# Patient Record
Sex: MALE | Race: BLACK OR AFRICAN AMERICAN | NOT HISPANIC OR LATINO | Employment: FULL TIME | URBAN - METROPOLITAN AREA
[De-identification: names, ages, dates, MRNs, and addresses within clinical notes are randomized per-mention and may not be internally consistent; named-entity substitution may affect disease eponyms.]

---

## 2023-08-31 PROBLEM — E78.1 HYPERTRIGLYCERIDEMIA: Status: ACTIVE | Noted: 2023-08-31

## 2023-08-31 PROBLEM — E10.65 POORLY CONTROLLED TYPE 1 DIABETES MELLITUS (MULTI): Status: ACTIVE | Noted: 2023-08-31

## 2023-08-31 PROBLEM — R82.4 KETONURIA: Status: ACTIVE | Noted: 2023-08-31

## 2023-08-31 PROBLEM — D80.2 IGA DEFICIENCY (MULTI): Status: ACTIVE | Noted: 2023-08-31

## 2023-08-31 RX ORDER — INSULIN LISPRO 100 [IU]/ML
INJECTION, SOLUTION INTRAVENOUS; SUBCUTANEOUS
COMMUNITY
Start: 2015-08-05 | End: 2023-10-09 | Stop reason: SDUPTHER

## 2023-08-31 RX ORDER — PEN NEEDLE, DIABETIC 30 GX3/16"
NEEDLE, DISPOSABLE MISCELLANEOUS
COMMUNITY

## 2023-08-31 RX ORDER — ISOPROPYL ALCOHOL 70 ML/100ML
SWAB TOPICAL
COMMUNITY

## 2023-08-31 RX ORDER — BLOOD SUGAR DIAGNOSTIC
STRIP MISCELLANEOUS
COMMUNITY
Start: 2016-02-22

## 2023-08-31 RX ORDER — URINE ACETONE TEST STRIPS
STRIP MISCELLANEOUS
COMMUNITY
Start: 2015-08-05

## 2023-08-31 RX ORDER — INSULIN DEGLUDEC 100 U/ML
34 INJECTION, SOLUTION SUBCUTANEOUS NIGHTLY
COMMUNITY
Start: 2018-02-01 | End: 2023-10-09 | Stop reason: SDUPTHER

## 2023-08-31 RX ORDER — IBUPROFEN 200 MG
16 TABLET ORAL AS NEEDED
COMMUNITY
Start: 2015-08-27

## 2023-08-31 RX ORDER — LANCETS 33 GAUGE
EACH MISCELLANEOUS
COMMUNITY
Start: 2016-02-22

## 2023-10-09 ENCOUNTER — OFFICE VISIT (OUTPATIENT)
Dept: ENDOCRINOLOGY | Facility: CLINIC | Age: 23
End: 2023-10-09
Payer: COMMERCIAL

## 2023-10-09 VITALS — WEIGHT: 158 LBS | BODY MASS INDEX: 24.02 KG/M2

## 2023-10-09 DIAGNOSIS — D80.2 IGA DEFICIENCY (MULTI): ICD-10-CM

## 2023-10-09 DIAGNOSIS — E78.1 HYPERTRIGLYCERIDEMIA: ICD-10-CM

## 2023-10-09 DIAGNOSIS — E10.65 POORLY CONTROLLED TYPE 1 DIABETES MELLITUS (MULTI): Primary | ICD-10-CM

## 2023-10-09 LAB — POC HEMOGLOBIN A1C: 8.5 % (ref 4.2–6.5)

## 2023-10-09 PROCEDURE — 83036 HEMOGLOBIN GLYCOSYLATED A1C: CPT | Performed by: INTERNAL MEDICINE

## 2023-10-09 PROCEDURE — 3052F HG A1C>EQUAL 8.0%<EQUAL 9.0%: CPT | Performed by: INTERNAL MEDICINE

## 2023-10-09 PROCEDURE — 99214 OFFICE O/P EST MOD 30 MIN: CPT | Performed by: INTERNAL MEDICINE

## 2023-10-09 RX ORDER — BLOOD-GLUCOSE SENSOR
1 EACH MISCELLANEOUS
Qty: 2 EACH | Refills: 11 | Status: SHIPPED | OUTPATIENT
Start: 2023-10-09 | End: 2024-02-06 | Stop reason: SDUPTHER

## 2023-10-09 RX ORDER — INSULIN LISPRO 100 [IU]/ML
15 INJECTION, SOLUTION INTRAVENOUS; SUBCUTANEOUS
Qty: 5 EACH | Refills: 11 | Status: SHIPPED | OUTPATIENT
Start: 2023-10-09

## 2023-10-09 NOTE — PROGRESS NOTES
Patient ID: Celso Warren is a 22 y.o. male who presents for No chief complaint on file..  HPI  The patient comes in for follow up.    He has type 1 diabetes uncontrolled hypertriglyceridemia IgA deficiency.    He continues on Tresiba 34 units Humalog by pen 1 unit for 9 g of carb with a correction of 1 unit lower 30 points.    He has been doing fingersticks of late.    At 1 point he was doing the freestyle mahnaz 14-day.    He has had no severe low blood sugars.    Physically he has no complaints.    ROS  Comprehensive review of systems is negative.    Objective   Physical Exam  Weight 158 up 3 pounds    ENT normal. No adenopathy  Fundi normal  Thyroid palpable and normal. No nodules  Chest clear to auscultation  Heart sounds are normal  Abdomen nontender. Bowel sounds normal. No organomegaly  Feet are okay  Normal vibration and monofilament sensation normal pulses, no lesions    Assessment/Plan     1.  Type 1 diabetes uncontrolled with no complications  2.  Hypertriglyceridemia resolved  3.  IgA deficiency    He will try out the freestyle mahnaz 3.    We discussed hypoglycemia and treatment.    We will check hemoglobin A1c by fingerstick today.    He will follow-up with me in 3 months fasting sooner as needed.

## 2024-01-08 ENCOUNTER — APPOINTMENT (OUTPATIENT)
Dept: ENDOCRINOLOGY | Facility: CLINIC | Age: 24
End: 2024-01-08
Payer: COMMERCIAL

## 2024-02-06 ENCOUNTER — OFFICE VISIT (OUTPATIENT)
Dept: ENDOCRINOLOGY | Facility: CLINIC | Age: 24
End: 2024-02-06
Payer: COMMERCIAL

## 2024-02-06 ENCOUNTER — LAB (OUTPATIENT)
Dept: LAB | Facility: LAB | Age: 24
End: 2024-02-06
Payer: COMMERCIAL

## 2024-02-06 VITALS — SYSTOLIC BLOOD PRESSURE: 126 MMHG | BODY MASS INDEX: 24.94 KG/M2 | WEIGHT: 164 LBS | DIASTOLIC BLOOD PRESSURE: 74 MMHG

## 2024-02-06 DIAGNOSIS — E78.1 HYPERTRIGLYCERIDEMIA: ICD-10-CM

## 2024-02-06 DIAGNOSIS — E10.65 POORLY CONTROLLED TYPE 1 DIABETES MELLITUS (MULTI): ICD-10-CM

## 2024-02-06 DIAGNOSIS — E10.65 POORLY CONTROLLED TYPE 1 DIABETES MELLITUS (MULTI): Primary | ICD-10-CM

## 2024-02-06 LAB
ALT SERPL W P-5'-P-CCNC: 13 U/L (ref 10–52)
ANION GAP SERPL CALC-SCNC: 14 MMOL/L (ref 10–20)
BUN SERPL-MCNC: 11 MG/DL (ref 6–23)
CALCIUM SERPL-MCNC: 9.4 MG/DL (ref 8.6–10.6)
CHLORIDE SERPL-SCNC: 103 MMOL/L (ref 98–107)
CHOLEST SERPL-MCNC: 146 MG/DL (ref 0–199)
CHOLESTEROL/HDL RATIO: 2.7
CO2 SERPL-SCNC: 29 MMOL/L (ref 21–32)
CREAT SERPL-MCNC: 0.84 MG/DL (ref 0.5–1.3)
CREAT UR-MCNC: 277.3 MG/DL (ref 20–370)
EGFRCR SERPLBLD CKD-EPI 2021: >90 ML/MIN/1.73M*2
EST. AVERAGE GLUCOSE BLD GHB EST-MCNC: 209 MG/DL
GLUCOSE SERPL-MCNC: NORMAL MG/DL
HBA1C MFR BLD: 8.9 %
HDLC SERPL-MCNC: 53.6 MG/DL
LDLC SERPL CALC-MCNC: 83 MG/DL
MICROALBUMIN UR-MCNC: 9 MG/L
MICROALBUMIN/CREAT UR: 3.2 UG/MG CREAT
NON HDL CHOLESTEROL: 92 MG/DL (ref 0–149)
POTASSIUM SERPL-SCNC: 3.9 MMOL/L (ref 3.5–5.3)
SODIUM SERPL-SCNC: 142 MMOL/L (ref 136–145)
TRIGL SERPL-MCNC: 45 MG/DL (ref 0–149)
TSH SERPL-ACNC: 2.21 MIU/L (ref 0.44–3.98)
VLDL: 9 MG/DL (ref 0–40)

## 2024-02-06 PROCEDURE — 82043 UR ALBUMIN QUANTITATIVE: CPT

## 2024-02-06 PROCEDURE — 3078F DIAST BP <80 MM HG: CPT | Performed by: INTERNAL MEDICINE

## 2024-02-06 PROCEDURE — 36415 COLL VENOUS BLD VENIPUNCTURE: CPT

## 2024-02-06 PROCEDURE — 83036 HEMOGLOBIN GLYCOSYLATED A1C: CPT

## 2024-02-06 PROCEDURE — 84520 ASSAY OF UREA NITROGEN: CPT

## 2024-02-06 PROCEDURE — 82310 ASSAY OF CALCIUM: CPT

## 2024-02-06 PROCEDURE — 80061 LIPID PANEL: CPT

## 2024-02-06 PROCEDURE — 82570 ASSAY OF URINE CREATININE: CPT

## 2024-02-06 PROCEDURE — 82565 ASSAY OF CREATININE: CPT

## 2024-02-06 PROCEDURE — 80051 ELECTROLYTE PANEL: CPT

## 2024-02-06 PROCEDURE — 84443 ASSAY THYROID STIM HORMONE: CPT

## 2024-02-06 PROCEDURE — 3074F SYST BP LT 130 MM HG: CPT | Performed by: INTERNAL MEDICINE

## 2024-02-06 PROCEDURE — 99214 OFFICE O/P EST MOD 30 MIN: CPT | Performed by: INTERNAL MEDICINE

## 2024-02-06 PROCEDURE — 84460 ALANINE AMINO (ALT) (SGPT): CPT

## 2024-02-06 RX ORDER — BLOOD-GLUCOSE SENSOR
1 EACH MISCELLANEOUS
Qty: 2 EACH | Refills: 11 | Status: SHIPPED | OUTPATIENT
Start: 2024-02-06

## 2024-02-06 RX ORDER — BLOOD-GLUCOSE SENSOR
1 EACH MISCELLANEOUS
Qty: 6 EACH | Refills: 3 | Status: SHIPPED | OUTPATIENT
Start: 2024-02-06 | End: 2025-02-05

## 2024-02-06 NOTE — PROGRESS NOTES
Patient ID: Celso Warren is a 23 y.o. male who presents for Follow-up.  HPI  The patient comes in for follow up.    He has type 1 diabetes uncontrolled hypertriglyceridemia IgA deficiency.    He continues on Tresiba 34 units Humalog by pen 1 unit for 9 g of carb with a correction of 1 unit lower 30 points.    He indicates that he has been more consistent of late taking his bedtime insulin.    He has been doing fingersticks of late.    At 1 point he was doing the freestyle mahnaz 14-day.  We called in the freestyle mahnaz 3 but he did not fill the prescription.    He has had no severe low blood sugars.    Physically he has no complaints.    ROS  Comprehensive review of systems is negative.    Objective   Physical Exam  There were no vitals taken for this visit.   Vitals:    02/06/24 1405   Weight: 74.4 kg (164 lb)      Body mass index is 24.94 kg/m².      ENT normal. No adenopathy  Fundi normal  Thyroid palpable and normal. No nodules  Chest clear to auscultation  Heart sounds are normal  Abdomen nontender. Bowel sounds normal. No organomegaly  Feet are okay  Normal vibration and monofilament sensation normal pulses, no lesions    Current Outpatient Medications   Medication Sig Dispense Refill    acetone, urine, test (Ketostix) strip TEST URINE FOR KETONES IF BLOOD SUGAR GREATER THAN 250, WITH ILLNESS , OR INSULIN DOSE MISSED      alcohol swabs (Alcohol Prep Pads) pads, medicated Apply topically. Alcohol prep 70% pad; use 4-6 daily for injections and blood sugar testing      blood-glucose sensor (FreeStyle Mahnaz 3 Sensor) device Inject 1 kit under the skin every 14 (fourteen) days. 6 each 3    blood-glucose sensor (FreeStyle Mahnaz 3 Sensor) device 1 each every 14 (fourteen) days. Apply to upper arm 2 each 11    flash glucose sensor (FREESTYLE MAHNAZ 14 DAY SENSOR MISC) every 14 (fourteen) days.      glucagon (Glucagen) 1 mg injection 1 mg. as directed for severe hypoglycemia      glucose 4 gram chewable tablet Chew 4  "tablets (16 g) if needed for low blood sugar - see comments (every 15 minutes as needed for blood sugar <70).      insulin degludec (Tresiba FlexTouch U-100) 100 unit/mL (3 mL) injection Inject 34 Units under the skin once daily at bedtime. 15 mL 11    insulin lispro (HumaLOG KwikPen Insulin) 100 unit/mL injection Inject 15 Units under the skin 3 times a day with meals. INJECT UP TO 45 UNITS UNDER THE SKIN DAILY PER SLIDING SCALE 15 each 11    insulin lispro (HumaLOG KwikPen Insulin) 100 unit/mL injection Inject 15 Units under the skin 3 times a day with meals. INJECT UP TO 45 UNITS UNDER THE SKIN DAILY PER SLIDING SCALE 5 each 11    lancets 33 gauge misc test 6-7 times daily      OneTouch Ultra Test strip test blood glucose up to 7 times per day      pen needle, diabetic (BD Ultra-Fine Mini Pen Needle) 31 gauge x 3/16\" needle Use 4-6 times daily       No current facility-administered medications for this visit.       Assessment/Plan     1.  Type 1 diabetes  2.  Hypertriglyceridemia  3.  IgA deficiency    Will check hemoglobin A1c ALT BMP TSH lipid profile urine microalbumin today.    Will try to get him on the freestyle mahnaz 3.    He will work on making sure he takes his insulin as prescribed.    We discussed the impact of more frequent blood sugar checks on his control.    He will follow-up with me in 3 months sooner as needed.      "

## 2024-05-07 ENCOUNTER — OFFICE VISIT (OUTPATIENT)
Dept: ENDOCRINOLOGY | Facility: CLINIC | Age: 24
End: 2024-05-07
Payer: COMMERCIAL

## 2024-05-07 VITALS — BODY MASS INDEX: 24.33 KG/M2 | SYSTOLIC BLOOD PRESSURE: 118 MMHG | DIASTOLIC BLOOD PRESSURE: 72 MMHG | WEIGHT: 160 LBS

## 2024-05-07 DIAGNOSIS — E10.65 POORLY CONTROLLED TYPE 1 DIABETES MELLITUS (MULTI): Primary | ICD-10-CM

## 2024-05-07 DIAGNOSIS — E78.1 HYPERTRIGLYCERIDEMIA: ICD-10-CM

## 2024-05-07 DIAGNOSIS — D80.2 IGA DEFICIENCY (MULTI): ICD-10-CM

## 2024-05-07 LAB — POC HEMOGLOBIN A1C: 8.2 % (ref 4.2–6.5)

## 2024-05-07 PROCEDURE — 99214 OFFICE O/P EST MOD 30 MIN: CPT | Performed by: INTERNAL MEDICINE

## 2024-05-07 PROCEDURE — 3052F HG A1C>EQUAL 8.0%<EQUAL 9.0%: CPT | Performed by: INTERNAL MEDICINE

## 2024-05-07 PROCEDURE — 3048F LDL-C <100 MG/DL: CPT | Performed by: INTERNAL MEDICINE

## 2024-05-07 PROCEDURE — 3074F SYST BP LT 130 MM HG: CPT | Performed by: INTERNAL MEDICINE

## 2024-05-07 PROCEDURE — 3061F NEG MICROALBUMINURIA REV: CPT | Performed by: INTERNAL MEDICINE

## 2024-05-07 PROCEDURE — 83036 HEMOGLOBIN GLYCOSYLATED A1C: CPT | Performed by: INTERNAL MEDICINE

## 2024-05-07 PROCEDURE — 3078F DIAST BP <80 MM HG: CPT | Performed by: INTERNAL MEDICINE

## 2024-05-07 NOTE — PROGRESS NOTES
Patient ID: Celso Warren is a 23 y.o. male who presents for Follow-up.  HPI  The patient comes in for follow up.    He has type 1 diabetes uncontrolled hypertriglyceridemia IgA deficiency.    He continues on Tresiba 34 units Humalog by pen 1 unit for 9 g of carb with a correction of 1 unit lower 30 points.    He indicates that he has been more consistent of late taking his bedtime insulin.    He has been doing fingersticks of late.    At 1 point he was doing the freestyle mahnaz 14-day.  We called in the freestyle mahnaz 3 but he did not fill the prescription.    He has had no severe low blood sugars.    Physically he has no complaints.    ROS  Comprehensive review of systems is negative.    Objective   Physical Exam  Visit Vitals  /72      Vitals:    05/07/24 1337   Weight: 72.6 kg (160 lb)      Body mass index is 24.33 kg/m².      Weight 160 down 4 pounds    ENT normal. No adenopathy  Fundi normal  Thyroid palpable and normal. No nodules  Chest clear to auscultation  Heart sounds are normal  Abdomen nontender. Bowel sounds normal. No organomegaly  Feet are okay  Normal vibration and monofilament sensation normal pulses, no lesions    Current Outpatient Medications   Medication Sig Dispense Refill    acetone, urine, test (Ketostix) strip TEST URINE FOR KETONES IF BLOOD SUGAR GREATER THAN 250, WITH ILLNESS , OR INSULIN DOSE MISSED      alcohol swabs (Alcohol Prep Pads) pads, medicated Apply topically. Alcohol prep 70% pad; use 4-6 daily for injections and blood sugar testing      blood-glucose sensor (FreeStyle Mahnaz 3 Sensor) device Inject 1 kit under the skin every 14 (fourteen) days. 6 each 3    blood-glucose sensor (FreeStyle Mahnaz 3 Sensor) device 1 each every 14 (fourteen) days. Apply to upper arm 2 each 11    flash glucose sensor (FREESTYLE MAHNAZ 14 DAY SENSOR MISC) every 14 (fourteen) days.      glucagon (Glucagen) 1 mg injection 1 mg. as directed for severe hypoglycemia      glucose 4 gram chewable  "tablet Chew 4 tablets (16 g) if needed for low blood sugar - see comments (every 15 minutes as needed for blood sugar <70).      insulin degludec (Tresiba FlexTouch U-100) 100 unit/mL (3 mL) injection Inject 34 Units under the skin once daily at bedtime. 15 mL 11    insulin lispro (HumaLOG KwikPen Insulin) 100 unit/mL injection Inject 15 Units under the skin 3 times a day with meals. INJECT UP TO 45 UNITS UNDER THE SKIN DAILY PER SLIDING SCALE 15 each 11    insulin lispro (HumaLOG KwikPen Insulin) 100 unit/mL injection Inject 15 Units under the skin 3 times a day with meals. INJECT UP TO 45 UNITS UNDER THE SKIN DAILY PER SLIDING SCALE 5 each 11    lancets 33 gauge misc test 6-7 times daily      OneTouch Ultra Test strip test blood glucose up to 7 times per day      pen needle, diabetic (BD Ultra-Fine Mini Pen Needle) 31 gauge x 3/16\" needle Use 4-6 times daily       No current facility-administered medications for this visit.       Assessment/Plan     1.  Type 1 diabetes uncontrolled  2.  Hypertriglyceridemia  3.  IgA deficiency    Will check hemoglobin A1c by fingerstick today.    I will give him a sample of freestyle mahnaz 3 to try out.    We discussed the impact of data on blood sugar adjustments and control.    He will follow-up with me in 3 months sooner as needed.        "

## 2024-07-18 DIAGNOSIS — E10.65 POORLY CONTROLLED TYPE 1 DIABETES MELLITUS (MULTI): ICD-10-CM

## 2024-07-18 RX ORDER — INSULIN DEGLUDEC 100 U/ML
INJECTION, SOLUTION SUBCUTANEOUS
Qty: 45 ML | Refills: 3 | Status: SHIPPED | OUTPATIENT
Start: 2024-07-18

## 2024-07-18 RX ORDER — INSULIN LISPRO 100 [IU]/ML
INJECTION, SOLUTION INTRAVENOUS; SUBCUTANEOUS
Qty: 45 ML | Refills: 3 | Status: SHIPPED | OUTPATIENT
Start: 2024-07-18

## 2024-08-09 ENCOUNTER — APPOINTMENT (OUTPATIENT)
Dept: ENDOCRINOLOGY | Facility: CLINIC | Age: 24
End: 2024-08-09
Payer: COMMERCIAL

## 2024-08-09 VITALS — WEIGHT: 158 LBS | DIASTOLIC BLOOD PRESSURE: 66 MMHG | BODY MASS INDEX: 24.02 KG/M2 | SYSTOLIC BLOOD PRESSURE: 114 MMHG

## 2024-08-09 DIAGNOSIS — E10.65 POORLY CONTROLLED TYPE 1 DIABETES MELLITUS (MULTI): Primary | ICD-10-CM

## 2024-08-09 DIAGNOSIS — E78.1 HYPERTRIGLYCERIDEMIA: ICD-10-CM

## 2024-08-09 DIAGNOSIS — D80.2 IGA DEFICIENCY (MULTI): ICD-10-CM

## 2024-08-09 LAB — POC HEMOGLOBIN A1C: 8.1 % (ref 4.2–6.5)

## 2024-08-09 PROCEDURE — 3074F SYST BP LT 130 MM HG: CPT | Performed by: INTERNAL MEDICINE

## 2024-08-09 PROCEDURE — 3052F HG A1C>EQUAL 8.0%<EQUAL 9.0%: CPT | Performed by: INTERNAL MEDICINE

## 2024-08-09 PROCEDURE — 83036 HEMOGLOBIN GLYCOSYLATED A1C: CPT | Performed by: INTERNAL MEDICINE

## 2024-08-09 PROCEDURE — 3048F LDL-C <100 MG/DL: CPT | Performed by: INTERNAL MEDICINE

## 2024-08-09 PROCEDURE — 99214 OFFICE O/P EST MOD 30 MIN: CPT | Performed by: INTERNAL MEDICINE

## 2024-08-09 PROCEDURE — 3061F NEG MICROALBUMINURIA REV: CPT | Performed by: INTERNAL MEDICINE

## 2024-08-09 PROCEDURE — 3078F DIAST BP <80 MM HG: CPT | Performed by: INTERNAL MEDICINE

## 2024-08-09 RX ORDER — BLOOD-GLUCOSE SENSOR
1 EACH MISCELLANEOUS
Qty: 2 EACH | Refills: 11 | Status: SHIPPED | OUTPATIENT
Start: 2024-08-09

## 2024-08-09 NOTE — PROGRESS NOTES
Patient ID: Celso Warren is a 23 y.o. male who presents for Follow-up.  HPI  The patient comes in for follow up.    He has type 1 diabetes uncontrolled hypertriglyceridemia IgA deficiency.    He continues on Tresiba 34 units Humalog by pen 1 unit for 9 g of carb with a correction of 1 unit lower 30 points.    He indicates that he has been more consistent of late taking his bedtime insulin.    He has been doing fingersticks of late.    He used the freestyle mahnaz 3 sample that I gave him at the last appointment but did not fill the prescription.    He has had no severe low blood sugars.    Physically he has no complaints.      ROS  Comprehensive review of systems is negative.    Objective   Physical Exam  Visit Vitals  /66      Vitals:    08/09/24 1350   Weight: 71.7 kg (158 lb)      Body mass index is 24.02 kg/m².      Weight 158 down 2 pounds    ENT normal. No adenopathy  Fundi normal  Thyroid palpable and normal. No nodules  Chest clear to auscultation  Heart sounds are normal  Abdomen nontender. Bowel sounds normal. No organomegaly  Feet are okay  Normal vibration and monofilament sensation normal pulses, no lesions    Current Outpatient Medications   Medication Sig Dispense Refill    acetone, urine, test (Ketostix) strip TEST URINE FOR KETONES IF BLOOD SUGAR GREATER THAN 250, WITH ILLNESS , OR INSULIN DOSE MISSED      alcohol swabs (Alcohol Prep Pads) pads, medicated Apply topically. Alcohol prep 70% pad; use 4-6 daily for injections and blood sugar testing      blood-glucose sensor (FreeStyle Mahnaz 3 Sensor) device Inject 1 kit under the skin every 14 (fourteen) days. 6 each 3    blood-glucose sensor (FreeStyle Mahnaz 3 Sensor) device 1 each every 14 (fourteen) days. Apply to upper arm 2 each 11    flash glucose sensor (FREESTYLE MAHNAZ 14 DAY SENSOR MISC) every 14 (fourteen) days.      glucagon (Glucagen) 1 mg injection 1 mg. as directed for severe hypoglycemia      glucose 4 gram chewable tablet Chew 4  "tablets (16 g) if needed for low blood sugar - see comments (every 15 minutes as needed for blood sugar <70).      HumaLOG KwikPen Insulin 100 unit/mL injection INJECT UP TO 45 UNITS UNDER THE SKIN DAILY PER SLIDING SCALE 45 mL 3    insulin lispro (HumaLOG KwikPen Insulin) 100 unit/mL injection Inject 15 Units under the skin 3 times a day with meals. INJECT UP TO 45 UNITS UNDER THE SKIN DAILY PER SLIDING SCALE 15 each 11    insulin lispro (HumaLOG KwikPen Insulin) 100 unit/mL injection Inject 15 Units under the skin 3 times a day with meals. INJECT UP TO 45 UNITS UNDER THE SKIN DAILY PER SLIDING SCALE 5 each 11    lancets 33 gauge misc test 6-7 times daily      OneTouch Ultra Test strip test blood glucose up to 7 times per day      pen needle, diabetic (BD Ultra-Fine Mini Pen Needle) 31 gauge x 3/16\" needle Use 4-6 times daily      Tresiba FlexTouch U-100 100 unit/mL (3 mL) injection INJECT 34 UNITS EVERY NIGHT 45 mL 3     No current facility-administered medications for this visit.       Assessment/Plan     1.  Type 1 diabetes  2.  Hypertriglyceridemia  3.  IgA deficiency    Will check hemoglobin A1c by fingerstick today.    We again discussed the benefits of data with regards to his blood sugars for pattern recognition blood sugar adjustment in control.    Will call him in a prescription for the freestyle mahnza sensors.    He will adjust his insulin accordingly.    He will follow-up with me in 3 months sooner as needed.        "

## 2024-12-02 ENCOUNTER — APPOINTMENT (OUTPATIENT)
Dept: ENDOCRINOLOGY | Facility: CLINIC | Age: 24
End: 2024-12-02
Payer: COMMERCIAL

## 2024-12-03 ENCOUNTER — APPOINTMENT (OUTPATIENT)
Dept: ENDOCRINOLOGY | Facility: CLINIC | Age: 24
End: 2024-12-03
Payer: COMMERCIAL

## 2024-12-03 VITALS — SYSTOLIC BLOOD PRESSURE: 112 MMHG | DIASTOLIC BLOOD PRESSURE: 70 MMHG | WEIGHT: 165 LBS | BODY MASS INDEX: 25.09 KG/M2

## 2024-12-03 DIAGNOSIS — E10.65 POORLY CONTROLLED TYPE 1 DIABETES MELLITUS: Primary | ICD-10-CM

## 2024-12-03 DIAGNOSIS — E78.1 HYPERTRIGLYCERIDEMIA: ICD-10-CM

## 2024-12-03 DIAGNOSIS — D80.2 IGA DEFICIENCY (MULTI): ICD-10-CM

## 2024-12-03 LAB — POC HEMOGLOBIN A1C: 8.6 % (ref 4.2–6.5)

## 2024-12-03 PROCEDURE — 99214 OFFICE O/P EST MOD 30 MIN: CPT | Performed by: INTERNAL MEDICINE

## 2024-12-03 PROCEDURE — 3052F HG A1C>EQUAL 8.0%<EQUAL 9.0%: CPT | Performed by: INTERNAL MEDICINE

## 2024-12-03 PROCEDURE — 3074F SYST BP LT 130 MM HG: CPT | Performed by: INTERNAL MEDICINE

## 2024-12-03 PROCEDURE — 3048F LDL-C <100 MG/DL: CPT | Performed by: INTERNAL MEDICINE

## 2024-12-03 PROCEDURE — 3078F DIAST BP <80 MM HG: CPT | Performed by: INTERNAL MEDICINE

## 2024-12-03 PROCEDURE — 83036 HEMOGLOBIN GLYCOSYLATED A1C: CPT | Performed by: INTERNAL MEDICINE

## 2024-12-03 PROCEDURE — 3061F NEG MICROALBUMINURIA REV: CPT | Performed by: INTERNAL MEDICINE

## 2024-12-03 NOTE — PROGRESS NOTES
Patient ID: Celso Warren is a 24 y.o. male who presents for Follow-up.  HPI  The patient comes in for follow up.    He has type 1 diabetes uncontrolled hypertriglyceridemia IgA deficiency.    He continues on Tresiba 34 units Humalog by pen 1 unit for 9 g of carb with a correction of 1 unit lower 30 points.    He indicates that he has been more consistent of late taking his bedtime insulin.    He has been doing fingersticks of late.    He did not fill the prescription for the freestyle mahnaz 3.    He has had no severe low blood sugars.    Physically he has no complaints.    ROS  Comprehensive review of systems is negative.    Objective   Physical Exam  Visit Vitals  /70      Vitals:    12/03/24 1322   Weight: 74.8 kg (165 lb)      Body mass index is 25.09 kg/m².      165 up 7 pounds    ENT normal. No adenopathy  Fundi normal  Thyroid palpable and normal. No nodules  Chest clear to auscultation  Heart sounds are normal  Abdomen nontender. Bowel sounds normal. No organomegaly  Feet are okay  Normal vibration and monofilament sensation normal pulses, no lesions    Current Outpatient Medications   Medication Sig Dispense Refill    acetone, urine, test (Ketostix) strip TEST URINE FOR KETONES IF BLOOD SUGAR GREATER THAN 250, WITH ILLNESS , OR INSULIN DOSE MISSED      alcohol swabs (Alcohol Prep Pads) pads, medicated Apply topically. Alcohol prep 70% pad; use 4-6 daily for injections and blood sugar testing      blood-glucose sensor (FreeStyle Mahnaz 3 Sensor) device Inject 1 kit under the skin every 14 (fourteen) days. 6 each 3    blood-glucose sensor (FreeStyle Mahnaz 3 Sensor) device 1 each every 14 (fourteen) days. Apply to upper arm 2 each 11    flash glucose sensor (FREESTYLE MAHNAZ 14 DAY SENSOR MISC) every 14 (fourteen) days.      glucagon (Glucagen) 1 mg injection 1 mg. as directed for severe hypoglycemia      glucose 4 gram chewable tablet Chew 4 tablets (16 g) if needed for low blood sugar - see comments  "(every 15 minutes as needed for blood sugar <70).      HumaLOG KwikPen Insulin 100 unit/mL injection INJECT UP TO 45 UNITS UNDER THE SKIN DAILY PER SLIDING SCALE 45 mL 3    insulin lispro (HumaLOG KwikPen Insulin) 100 unit/mL injection Inject 15 Units under the skin 3 times a day with meals. INJECT UP TO 45 UNITS UNDER THE SKIN DAILY PER SLIDING SCALE 15 each 11    insulin lispro (HumaLOG KwikPen Insulin) 100 unit/mL injection Inject 15 Units under the skin 3 times a day with meals. INJECT UP TO 45 UNITS UNDER THE SKIN DAILY PER SLIDING SCALE 5 each 11    lancets 33 gauge misc test 6-7 times daily      OneTouch Ultra Test strip test blood glucose up to 7 times per day      pen needle, diabetic (BD Ultra-Fine Mini Pen Needle) 31 gauge x 3/16\" needle Use 4-6 times daily      Tresiba FlexTouch U-100 100 unit/mL (3 mL) injection INJECT 34 UNITS EVERY NIGHT 45 mL 3     No current facility-administered medications for this visit.       Assessment/Plan     1.  Type 1 diabetes uncontrolled  2.  Hypertriglyceridemia  3.  IgA deficiency    Will check hemoglobin A1c by fingerstick today.    We again discussed the benefits of information with regards to blood sugar control for pattern recognition blood sugar adjustments and control.    I have given him another sample of the freestyle mahnaz 3.    He will continue make adjustments as warranted.    He will follow-up with me in 3 months fasting sooner as needed.        "

## 2025-04-03 ENCOUNTER — APPOINTMENT (OUTPATIENT)
Dept: ENDOCRINOLOGY | Facility: CLINIC | Age: 25
End: 2025-04-03
Payer: COMMERCIAL

## 2025-04-03 VITALS — WEIGHT: 165 LBS | SYSTOLIC BLOOD PRESSURE: 118 MMHG | BODY MASS INDEX: 25.09 KG/M2 | DIASTOLIC BLOOD PRESSURE: 70 MMHG

## 2025-04-03 DIAGNOSIS — E78.1 HYPERTRIGLYCERIDEMIA: ICD-10-CM

## 2025-04-03 DIAGNOSIS — E10.65 POORLY CONTROLLED TYPE 1 DIABETES MELLITUS: Primary | ICD-10-CM

## 2025-04-03 DIAGNOSIS — D80.2 IGA DEFICIENCY (MULTI): ICD-10-CM

## 2025-04-03 PROCEDURE — 99214 OFFICE O/P EST MOD 30 MIN: CPT | Performed by: INTERNAL MEDICINE

## 2025-04-03 PROCEDURE — 3078F DIAST BP <80 MM HG: CPT | Performed by: INTERNAL MEDICINE

## 2025-04-03 PROCEDURE — 3074F SYST BP LT 130 MM HG: CPT | Performed by: INTERNAL MEDICINE

## 2025-04-03 RX ORDER — BLOOD-GLUCOSE SENSOR
1 EACH MISCELLANEOUS
Qty: 2 EACH | Refills: 11 | Status: SHIPPED | OUTPATIENT
Start: 2025-04-03 | End: 2026-04-03

## 2025-04-03 RX ORDER — PEN NEEDLE, DIABETIC 30 GX3/16"
1 NEEDLE, DISPOSABLE MISCELLANEOUS 4 TIMES DAILY
Qty: 400 EACH | Refills: 2 | Status: SHIPPED | OUTPATIENT
Start: 2025-04-03

## 2025-04-03 NOTE — PROGRESS NOTES
Patient ID: Celso Warren is a 24 y.o. male who presents for Follow-up.  HPI  The patient comes in for follow up.    He has type 1 diabetes uncontrolled hypertriglyceridemia IgA deficiency.    He continues on Tresiba 34 units Humalog by pen 1 unit for 9 g of carb with a correction of 1 unit lower 30 points.    He indicates that he has been more consistent of late taking his bedtime insulin.    He has been doing fingersticks of late.    He did not fill the prescription for the freestyle mahnaz 3.    He has had no severe low blood sugars.    Physically he has no complaints.    ROS  Comprehensive review of systems is negative.    Objective   Physical Exam  Visit Vitals  /70      Vitals:    04/03/25 1248   Weight: 74.8 kg (165 lb)      Body mass index is 25.09 kg/m².      Weight 165 stable    ENT normal. No adenopathy  Fundi normal  Thyroid palpable and normal. No nodules  Chest clear to auscultation  Heart sounds are normal  Abdomen nontender. Bowel sounds normal. No organomegaly  Feet are okay  Normal vibration and monofilament sensation normal pulses, no lesions    Current Outpatient Medications   Medication Sig Dispense Refill    acetone, urine, test (Ketostix) strip TEST URINE FOR KETONES IF BLOOD SUGAR GREATER THAN 250, WITH ILLNESS , OR INSULIN DOSE MISSED      alcohol swabs (Alcohol Prep Pads) pads, medicated Apply topically. Alcohol prep 70% pad; use 4-6 daily for injections and blood sugar testing      blood-glucose sensor (FreeStyle Mahnaz 3 Plus Sensor) device 1 each every 14 (fourteen) days. 2 each 11    blood-glucose sensor (FreeStyle Mahnaz 3 Sensor) device 1 each every 14 (fourteen) days. Apply to upper arm 2 each 11    flash glucose sensor (FREESTYLE MAHNAZ 14 DAY SENSOR MISC) every 14 (fourteen) days.      glucagon (Glucagen) 1 mg injection 1 mg. as directed for severe hypoglycemia      glucose 4 gram chewable tablet Chew 4 tablets (16 g) if needed for low blood sugar - see comments (every 15 minutes  "as needed for blood sugar <70).      HumaLOG KwikPen Insulin 100 unit/mL injection INJECT UP TO 45 UNITS UNDER THE SKIN DAILY PER SLIDING SCALE 45 mL 3    insulin lispro (HumaLOG KwikPen Insulin) 100 unit/mL injection Inject 15 Units under the skin 3 times a day with meals. INJECT UP TO 45 UNITS UNDER THE SKIN DAILY PER SLIDING SCALE 15 each 11    insulin lispro (HumaLOG KwikPen Insulin) 100 unit/mL injection Inject 15 Units under the skin 3 times a day with meals. INJECT UP TO 45 UNITS UNDER THE SKIN DAILY PER SLIDING SCALE 5 each 11    lancets 33 gauge misc test 6-7 times daily      OneTouch Ultra Test strip test blood glucose up to 7 times per day      pen needle, diabetic (BD Ultra-Fine Mini Pen Needle) 31 gauge x 3/16\" needle 1 each 4 times a day. Use 4-6 times daily 400 each 2    Tresiba FlexTouch U-100 100 unit/mL (3 mL) injection INJECT 34 UNITS EVERY NIGHT 45 mL 3     No current facility-administered medications for this visit.       Assessment/Plan     1.  Type 1 diabetes  2.  Hypertriglyceridemia  3.  IgA deficiency    Will check hemoglobin A1c ALT BMP TSH lipid profile urine microalbumin today.    We again discussed the importance of more frequent checks.    Again encouraged him to fill the prescription on the freestyle mahnaz 3.    We discussed trends and numbers to watch for.    We discussed hypoglycemia.    He will follow-up with me in 4 months sooner as needed.          "

## 2025-04-04 LAB
ALBUMIN/CREAT UR: 3 MG/G CREAT
ALT SERPL-CCNC: 13 U/L (ref 9–46)
ANION GAP SERPL CALCULATED.4IONS-SCNC: 9 MMOL/L (CALC) (ref 7–17)
BUN SERPL-MCNC: 13 MG/DL (ref 7–25)
BUN/CREAT SERPL: ABNORMAL (CALC) (ref 6–22)
CALCIUM SERPL-MCNC: 9.1 MG/DL (ref 8.6–10.3)
CHLORIDE SERPL-SCNC: 106 MMOL/L (ref 98–110)
CHOLEST SERPL-MCNC: 139 MG/DL
CHOLEST/HDLC SERPL: 2.4 (CALC)
CO2 SERPL-SCNC: 25 MMOL/L (ref 20–32)
CREAT SERPL-MCNC: 0.79 MG/DL (ref 0.6–1.24)
CREAT UR-MCNC: 217 MG/DL (ref 20–320)
EGFRCR SERPLBLD CKD-EPI 2021: 126 ML/MIN/1.73M2
EST. AVERAGE GLUCOSE BLD GHB EST-MCNC: 183 MG/DL
EST. AVERAGE GLUCOSE BLD GHB EST-SCNC: 10.1 MMOL/L
GLUCOSE SERPL-MCNC: 110 MG/DL (ref 65–99)
HBA1C MFR BLD: 8 % OF TOTAL HGB
HDLC SERPL-MCNC: 57 MG/DL
LDLC SERPL CALC-MCNC: 71 MG/DL (CALC)
MICROALBUMIN UR-MCNC: 0.7 MG/DL
NONHDLC SERPL-MCNC: 82 MG/DL (CALC)
POTASSIUM SERPL-SCNC: 4.3 MMOL/L (ref 3.5–5.3)
SODIUM SERPL-SCNC: 140 MMOL/L (ref 135–146)
TRIGL SERPL-MCNC: 40 MG/DL
TSH SERPL-ACNC: 1.71 MIU/L (ref 0.4–4.5)

## 2025-05-27 ENCOUNTER — TELEPHONE (OUTPATIENT)
Dept: PRIMARY CARE | Facility: CLINIC | Age: 25
End: 2025-05-27
Payer: COMMERCIAL

## 2025-05-27 NOTE — TELEPHONE ENCOUNTER
[facility] are all options it depends on which ones covered by his insurance.  
[id number]
21-Dec-2017

## 2025-06-12 ENCOUNTER — TELEPHONE (OUTPATIENT)
Dept: ENDOCRINOLOGY | Facility: CLINIC | Age: 25
End: 2025-06-12
Payer: COMMERCIAL

## 2025-06-13 DIAGNOSIS — E10.65 POORLY CONTROLLED TYPE 1 DIABETES MELLITUS: ICD-10-CM

## 2025-06-13 RX ORDER — INSULIN GLARGINE 100 [IU]/ML
INJECTION, SOLUTION SUBCUTANEOUS
Qty: 36 ML | Refills: 3 | Status: SHIPPED | OUTPATIENT
Start: 2025-06-13

## 2025-08-29 ENCOUNTER — APPOINTMENT (OUTPATIENT)
Dept: ENDOCRINOLOGY | Facility: CLINIC | Age: 25
End: 2025-08-29
Payer: COMMERCIAL

## 2025-08-29 VITALS — DIASTOLIC BLOOD PRESSURE: 68 MMHG | BODY MASS INDEX: 24.63 KG/M2 | SYSTOLIC BLOOD PRESSURE: 122 MMHG | WEIGHT: 162 LBS

## 2025-08-29 DIAGNOSIS — D80.2 IGA DEFICIENCY (MULTI): ICD-10-CM

## 2025-08-29 DIAGNOSIS — E78.1 HYPERTRIGLYCERIDEMIA: ICD-10-CM

## 2025-08-29 DIAGNOSIS — E10.65 POORLY CONTROLLED TYPE 1 DIABETES MELLITUS: Primary | ICD-10-CM

## 2025-08-29 LAB — POC HEMOGLOBIN A1C: 8.5 % (ref 4.2–7)

## 2025-08-29 PROCEDURE — 3074F SYST BP LT 130 MM HG: CPT | Performed by: INTERNAL MEDICINE

## 2025-08-29 PROCEDURE — 3078F DIAST BP <80 MM HG: CPT | Performed by: INTERNAL MEDICINE

## 2025-08-29 PROCEDURE — 3052F HG A1C>EQUAL 8.0%<EQUAL 9.0%: CPT | Performed by: INTERNAL MEDICINE

## 2025-08-29 PROCEDURE — 99214 OFFICE O/P EST MOD 30 MIN: CPT | Performed by: INTERNAL MEDICINE

## 2025-08-29 PROCEDURE — 83036 HEMOGLOBIN GLYCOSYLATED A1C: CPT | Performed by: INTERNAL MEDICINE

## 2025-08-29 RX ORDER — INSULIN GLARGINE 100 [IU]/ML
INJECTION, SOLUTION SUBCUTANEOUS
Qty: 36 ML | Refills: 3 | Status: SHIPPED | OUTPATIENT
Start: 2025-08-29

## 2025-08-29 RX ORDER — INSULIN LISPRO 100 [IU]/ML
15 INJECTION, SOLUTION INTRAVENOUS; SUBCUTANEOUS
Qty: 15 EACH | Refills: 11 | Status: SHIPPED | OUTPATIENT
Start: 2025-08-29

## 2026-01-16 ENCOUNTER — APPOINTMENT (OUTPATIENT)
Dept: ENDOCRINOLOGY | Facility: CLINIC | Age: 26
End: 2026-01-16
Payer: COMMERCIAL